# Patient Record
Sex: FEMALE | Race: WHITE | URBAN - METROPOLITAN AREA
[De-identification: names, ages, dates, MRNs, and addresses within clinical notes are randomized per-mention and may not be internally consistent; named-entity substitution may affect disease eponyms.]

---

## 2017-04-19 ENCOUNTER — IMPORTED ENCOUNTER (OUTPATIENT)
Dept: URBAN - METROPOLITAN AREA CLINIC 50 | Facility: CLINIC | Age: 72
End: 2017-04-19

## 2017-04-21 ENCOUNTER — IMPORTED ENCOUNTER (OUTPATIENT)
Dept: URBAN - METROPOLITAN AREA CLINIC 50 | Facility: CLINIC | Age: 72
End: 2017-04-21

## 2017-10-24 ENCOUNTER — IMPORTED ENCOUNTER (OUTPATIENT)
Dept: URBAN - METROPOLITAN AREA CLINIC 50 | Facility: CLINIC | Age: 72
End: 2017-10-24

## 2017-10-24 NOTE — PATIENT DISCUSSION
"""Informed patient that their cataract OD > OS is visually significant and meets the criteria for cataract surgery to increase their vision and decrease their glare symptoms.  Order scans for IOL measurements

## 2017-12-05 ENCOUNTER — IMPORTED ENCOUNTER (OUTPATIENT)
Dept: URBAN - METROPOLITAN AREA CLINIC 50 | Facility: CLINIC | Age: 72
End: 2017-12-05

## 2018-08-08 NOTE — PROCEDURE NOTE: SURGICAL
<p>Prior to commencing surgery patient identification, surgical procedure, site, and side were confirmed by Dr. Gladys Garcia. Following topical proparacaine anesthesia, the patient was positioned at the YAG laser, a contact lens coupled to the cornea with methylcellulose and an axial posterior capsulotomy performed without complication using 2.7 Mj x 13. Excess methylcellulose was washed from the eye, one drop of Alphagan was instilled and the patient returned to the holding area having tolerated the procedure well and without complication. </p><p>MRN:[id number]</p>

## 2018-08-15 NOTE — PROCEDURE NOTE: SURGICAL
<p>Prior to commencing surgery patient identification, surgical procedure, site, and side were confirmed by Dr. Kaleb Maharaj. Following topical proparacaine anesthesia, the patient was positioned at the YAG laser, a contact lens coupled to the cornea with methylcellulose and an axial posterior capsulotomy performed without complication using 2.6 Mj x 24. Excess methylcellulose was washed from the eye, one drop of Alphagan was instilled and the patient returned to the holding area having tolerated the procedure well and without complication. </p><p>MRN:[id number]</p>

## 2018-10-30 ENCOUNTER — IMPORTED ENCOUNTER (OUTPATIENT)
Dept: URBAN - METROPOLITAN AREA CLINIC 50 | Facility: CLINIC | Age: 73
End: 2018-10-30

## 2019-04-23 ENCOUNTER — IMPORTED ENCOUNTER (OUTPATIENT)
Dept: URBAN - METROPOLITAN AREA CLINIC 50 | Facility: CLINIC | Age: 74
End: 2019-04-23

## 2019-11-05 ENCOUNTER — IMPORTED ENCOUNTER (OUTPATIENT)
Dept: URBAN - METROPOLITAN AREA CLINIC 50 | Facility: CLINIC | Age: 74
End: 2019-11-05

## 2021-04-18 ASSESSMENT — VISUAL ACUITY
OD_OTHER: 20/50. 20/60.
OD_CC: J1
OS_OTHER: 20/50. 20/60.
OS_CC: J1+
OS_CC: J1+
OS_SC: 20/30+
OD_CC: J1+
OS_CC: 20/50-
OD_SC: 20/200
OD_OTHER: 20/50. 20/60.
OS_CC: 20/30+
OD_CC: 20/30+
OD_CC: J1+
OD_CC: 20/40-
OD_BAT: 20/50
OD_CC: 20/50+2
OS_BAT: 20/50
OD_BAT: 20/50
OS_BAT: 20/50
OS_OTHER: 20/50. 20/60.
OS_CC: 20/40-
OS_CC: J1

## 2021-04-18 ASSESSMENT — TONOMETRY
OD_IOP_MMHG: 16
OD_IOP_MMHG: 15
OD_IOP_MMHG: 18
OS_IOP_MMHG: 15
OS_IOP_MMHG: 18
OD_IOP_MMHG: 18
OS_IOP_MMHG: 18
OS_IOP_MMHG: 15